# Patient Record
Sex: FEMALE | Race: WHITE | Employment: FULL TIME | URBAN - METROPOLITAN AREA
[De-identification: names, ages, dates, MRNs, and addresses within clinical notes are randomized per-mention and may not be internally consistent; named-entity substitution may affect disease eponyms.]

---

## 2018-12-04 ENCOUNTER — OFFICE VISIT (OUTPATIENT)
Dept: FAMILY MEDICINE CLINIC | Facility: CLINIC | Age: 33
End: 2018-12-04
Payer: COMMERCIAL

## 2018-12-04 VITALS
HEART RATE: 82 BPM | BODY MASS INDEX: 27 KG/M2 | TEMPERATURE: 98 F | DIASTOLIC BLOOD PRESSURE: 64 MMHG | SYSTOLIC BLOOD PRESSURE: 100 MMHG | WEIGHT: 145 LBS

## 2018-12-04 DIAGNOSIS — Z00.00 ROUTINE GENERAL MEDICAL EXAMINATION AT A HEALTH CARE FACILITY: Primary | ICD-10-CM

## 2018-12-04 PROCEDURE — 99395 PREV VISIT EST AGE 18-39: CPT | Performed by: FAMILY MEDICINE

## 2018-12-04 RX ORDER — SWAB
SWAB, NON-MEDICATED MISCELLANEOUS
COMMUNITY
End: 2021-08-25

## 2018-12-04 NOTE — PROGRESS NOTES
HPI:    Patient ID: Jerzy Miller is a 35year old female. HPI  Patient presents with:  Physical: annual physical, PT DECLINED FLU SHOT   Lab: wants lab work due to becoming vegan    Review of Systems   Constitutional: Positive for fatigue.    HENT: ENRIQUE place, and time. She has normal strength and normal reflexes. No sensory deficit. Skin:   No suspicious lesions above the waist exam   Psychiatric: Her mood appears not anxious. She does not exhibit a depressed mood. Vitals reviewed.              ASSESS

## 2019-01-14 ENCOUNTER — LAB ENCOUNTER (OUTPATIENT)
Dept: LAB | Age: 34
End: 2019-01-14
Attending: FAMILY MEDICINE
Payer: COMMERCIAL

## 2019-01-14 DIAGNOSIS — Z00.00 ROUTINE GENERAL MEDICAL EXAMINATION AT A HEALTH CARE FACILITY: ICD-10-CM

## 2019-01-14 LAB
ANION GAP SERPL CALC-SCNC: 10 MMOL/L (ref 0–18)
BASOPHILS # BLD: 0.1 K/UL (ref 0–0.2)
BASOPHILS NFR BLD: 1 %
BUN SERPL-MCNC: 7 MG/DL (ref 8–20)
BUN/CREAT SERPL: 9.2 (ref 10–20)
CALCIUM SERPL-MCNC: 9 MG/DL (ref 8.5–10.5)
CHLORIDE SERPL-SCNC: 107 MMOL/L (ref 95–110)
CHOLEST SERPL-MCNC: 178 MG/DL (ref 110–200)
CO2 SERPL-SCNC: 21 MMOL/L (ref 22–32)
CREAT SERPL-MCNC: 0.76 MG/DL (ref 0.5–1.5)
EOSINOPHIL # BLD: 0.3 K/UL (ref 0–0.7)
EOSINOPHIL NFR BLD: 5 %
ERYTHROCYTE [DISTWIDTH] IN BLOOD BY AUTOMATED COUNT: 14.8 % (ref 11–15)
FERRITIN SERPL IA-MCNC: 23 NG/ML (ref 11–307)
GLUCOSE SERPL-MCNC: 87 MG/DL (ref 70–99)
HCT VFR BLD AUTO: 39.6 % (ref 35–48)
HDLC SERPL-MCNC: 67 MG/DL
HGB BLD-MCNC: 13.1 G/DL (ref 12–16)
LDLC SERPL CALC-MCNC: 100 MG/DL (ref 0–99)
LYMPHOCYTES # BLD: 2.6 K/UL (ref 1–4)
LYMPHOCYTES NFR BLD: 33 %
MCH RBC QN AUTO: 29 PG (ref 27–32)
MCHC RBC AUTO-ENTMCNC: 33.1 G/DL (ref 32–37)
MCV RBC AUTO: 87.7 FL (ref 80–100)
MONOCYTES # BLD: 0.6 K/UL (ref 0–1)
MONOCYTES NFR BLD: 7 %
NEUTROPHILS # BLD AUTO: 4.2 K/UL (ref 1.8–7.7)
NEUTROPHILS NFR BLD: 54 %
NONHDLC SERPL-MCNC: 111 MG/DL
OSMOLALITY UR CALC.SUM OF ELEC: 283 MOSM/KG (ref 275–295)
PLATELET # BLD AUTO: 261 K/UL (ref 140–400)
PMV BLD AUTO: 11.4 FL (ref 7.4–10.3)
POTASSIUM SERPL-SCNC: 4.2 MMOL/L (ref 3.3–5.1)
RBC # BLD AUTO: 4.51 M/UL (ref 3.7–5.4)
SODIUM SERPL-SCNC: 138 MMOL/L (ref 136–144)
TRIGL SERPL-MCNC: 54 MG/DL (ref 1–149)
TSH SERPL-ACNC: 4.44 UIU/ML (ref 0.45–5.33)
WBC # BLD AUTO: 7.7 K/UL (ref 4–11)

## 2019-01-14 PROCEDURE — 82728 ASSAY OF FERRITIN: CPT

## 2019-01-14 PROCEDURE — 85025 COMPLETE CBC W/AUTO DIFF WBC: CPT

## 2019-01-14 PROCEDURE — 80048 BASIC METABOLIC PNL TOTAL CA: CPT

## 2019-01-14 PROCEDURE — 80061 LIPID PANEL: CPT

## 2019-01-14 PROCEDURE — 84443 ASSAY THYROID STIM HORMONE: CPT

## 2019-01-14 PROCEDURE — 82306 VITAMIN D 25 HYDROXY: CPT

## 2019-01-14 PROCEDURE — 36415 COLL VENOUS BLD VENIPUNCTURE: CPT

## 2019-01-16 LAB — 25(OH)D3 SERPL-MCNC: 27.4 NG/ML (ref 30–100)

## 2019-01-28 ENCOUNTER — TELEPHONE (OUTPATIENT)
Dept: OTHER | Age: 34
End: 2019-01-28

## 2019-01-28 DIAGNOSIS — E55.9 VITAMIN D DEFICIENCY: ICD-10-CM

## 2019-01-28 DIAGNOSIS — E03.9 HYPOTHYROIDISM, UNSPECIFIED TYPE: Primary | ICD-10-CM

## 2019-01-28 NOTE — TELEPHONE ENCOUNTER
Patient calling back and states that she spoke with the nurse earlier regarding her test  Results and kind of fast that she needs to clarify it again, advised Dr Val Rosales note and verbalized understanding, patient able to read back the instructions, advis

## 2019-06-20 ENCOUNTER — OFFICE VISIT (OUTPATIENT)
Dept: FAMILY MEDICINE CLINIC | Facility: CLINIC | Age: 34
End: 2019-06-20
Payer: COMMERCIAL

## 2019-06-20 VITALS
BODY MASS INDEX: 26 KG/M2 | HEART RATE: 81 BPM | DIASTOLIC BLOOD PRESSURE: 63 MMHG | SYSTOLIC BLOOD PRESSURE: 99 MMHG | WEIGHT: 137 LBS

## 2019-06-20 DIAGNOSIS — D49.2 SKIN NEOPLASM: ICD-10-CM

## 2019-06-20 DIAGNOSIS — L98.9 SKIN ABNORMALITY: Primary | ICD-10-CM

## 2019-06-20 PROCEDURE — 99212 OFFICE O/P EST SF 10 MIN: CPT | Performed by: FAMILY MEDICINE

## 2019-06-20 PROCEDURE — 99213 OFFICE O/P EST LOW 20 MIN: CPT | Performed by: FAMILY MEDICINE

## 2019-06-20 NOTE — PROGRESS NOTES
HPI:    Patient ID: Anoop Coughlin is a 35year old female. HPI  Patient presents with:  Moles: pt has concern with color of mole on left side of neck      Review of Systems   Skin: Positive for color change.             Current Outpatient Medications:

## 2019-07-06 ENCOUNTER — OFFICE VISIT (OUTPATIENT)
Dept: FAMILY MEDICINE CLINIC | Facility: CLINIC | Age: 34
End: 2019-07-06
Payer: COMMERCIAL

## 2019-07-06 VITALS
SYSTOLIC BLOOD PRESSURE: 118 MMHG | DIASTOLIC BLOOD PRESSURE: 73 MMHG | HEART RATE: 61 BPM | BODY MASS INDEX: 26 KG/M2 | WEIGHT: 137 LBS

## 2019-07-06 DIAGNOSIS — D49.2 SKIN NEOPLASM: Primary | ICD-10-CM

## 2019-07-06 PROCEDURE — 11306 SHAVE SKIN LESION 0.6-1.0 CM: CPT | Performed by: FAMILY MEDICINE

## 2019-07-06 NOTE — PROGRESS NOTES
HPI:    Patient ID: Jeff Magana is a 35year old female. HPI  Patient presents with:  Mole Removal: mole removal on left side of neck    Review of Systems   Skin: Positive for color change.         Left neck growth of mole            Current Outpati

## 2019-07-06 NOTE — PROCEDURES
Area cleaned with alcohol, one cc lidocaine introduced for anesthesia. Shave biopsy performed with #111 blade. Dressing applied. Tolerated well. Cautery used for coag.

## 2019-10-21 ENCOUNTER — TELEPHONE (OUTPATIENT)
Dept: OPHTHALMOLOGY | Facility: CLINIC | Age: 34
End: 2019-10-21

## 2019-10-21 ENCOUNTER — NURSE TRIAGE (OUTPATIENT)
Dept: FAMILY MEDICINE CLINIC | Facility: CLINIC | Age: 34
End: 2019-10-21
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
## 2019-10-22 ENCOUNTER — OFFICE VISIT (OUTPATIENT)
Dept: OPHTHALMOLOGY | Facility: CLINIC | Age: 34
End: 2019-10-22
Payer: COMMERCIAL

## 2019-10-22 DIAGNOSIS — S05.01XA CORNEAL ABRASION, RIGHT, INITIAL ENCOUNTER: Primary | ICD-10-CM

## 2019-10-22 PROCEDURE — 99242 OFF/OP CONSLTJ NEW/EST SF 20: CPT | Performed by: OPHTHALMOLOGY

## 2019-10-22 RX ORDER — SODIUM CHLORIDE 5 %
OINTMENT (GRAM) OPHTHALMIC (EYE)
Qty: 1 TUBE | Refills: 0 | Status: SHIPPED | OUTPATIENT
Start: 2019-10-22 | End: 2020-01-22

## 2019-10-22 NOTE — PATIENT INSTRUCTIONS
Corneal abrasion, right, initial encounter  Discussed diagnosis and treatment in detail with patient. Information given. Start Adi 128 ointment (over the counter)  at bedtime in the right eye for 90 days.    Patient should call office and make retu motor vehicle or operate machinery until all symptoms are gone. You may have trouble judging distances using only one eye. · If your eyes are sensitive to light, try wearing sunglasses, or stay indoors until symptoms go away.     Follow-up care  Follow up serious. These are medical emergencies. Something in your eye  If you think you have something small in your eye, flush it with water right away. Pull your upper lid out and over your bottom lid. This will help increase the flow of tears across your eye.

## 2019-10-22 NOTE — ASSESSMENT & PLAN NOTE
Discussed diagnosis and treatment in detail with patient. Information given. Start Adi 128 ointment (over the counter)  at bedtime in the right eye for 90 days.    Patient should call office and make return appointment if symptoms worsen or do not
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
## 2020-02-26 ENCOUNTER — TELEPHONE (OUTPATIENT)
Dept: OPHTHALMOLOGY | Facility: CLINIC | Age: 35
End: 2020-02-26

## 2020-02-26 ENCOUNTER — OFFICE VISIT (OUTPATIENT)
Dept: OPHTHALMOLOGY | Facility: CLINIC | Age: 35
End: 2020-02-26
Payer: COMMERCIAL

## 2020-02-26 DIAGNOSIS — H18.831 RECURRENT CORNEAL EROSION, RIGHT: Primary | ICD-10-CM

## 2020-02-26 PROCEDURE — 99213 OFFICE O/P EST LOW 20 MIN: CPT | Performed by: OPHTHALMOLOGY

## 2020-02-26 PROCEDURE — 99070 SPECIAL SUPPLIES PHYS/QHP: CPT | Performed by: OPHTHALMOLOGY

## 2020-02-26 PROCEDURE — 92071 CONTACT LENS FITTING FOR TX: CPT | Performed by: OPHTHALMOLOGY

## 2020-02-26 RX ORDER — KETOROLAC TROMETHAMINE 5 MG/ML
1 SOLUTION OPHTHALMIC 4 TIMES DAILY
Qty: 1 BOTTLE | Refills: 0 | Status: SHIPPED | OUTPATIENT
Start: 2020-02-26 | End: 2021-08-25

## 2020-02-26 NOTE — ASSESSMENT & PLAN NOTE
Discussed diagnosis and treatment in detail with patient. Bandage contact lens applied to right eye. AV2 +0.75/8.7/14.0  Exp 1/3/2023  Start Acular drops 4 times a day in the right eye. Leave contact in until next visit next week.

## 2020-02-26 NOTE — TELEPHONE ENCOUNTER
Per pt she has been having R eye pain since yesterday, states she feels like it is getting worse, asking for appointment soon. Please call thank you.

## 2020-02-26 NOTE — TELEPHONE ENCOUNTER
Spoke with patient, complains of pain and light sensitivity OD since yesterday. Pt was seen on 10/22/19 for a corneal abrasion OD and feels that it has never fully healed, feels like it is the same problem as last time.  Pt coming @ 1:00pm today

## 2020-02-26 NOTE — PROGRESS NOTES
Lexie Chandra is a 29year old female. HPI:     HPI     Patient is here for an evaluation of redness, pain (10/10) and photophobia in the right eye  since this morning when she opened her eye.    She feels \"like a wound reopened\" and she is concerne • PRENATAL 28-0.8 MG Oral Tab Take by mouth.          Allergies:  No Known Allergies    ROS:       PHYSICAL EXAM:     Base Eye Exam     Visual Acuity (Snellen - Linear)       Right Left    Dist sc 20/30- 20/20            Slit Lamp and Fundus Exam     Slit

## 2020-03-03 ENCOUNTER — TELEPHONE (OUTPATIENT)
Dept: OPHTHALMOLOGY | Facility: CLINIC | Age: 35
End: 2020-03-03

## 2020-03-04 ENCOUNTER — OFFICE VISIT (OUTPATIENT)
Dept: OPHTHALMOLOGY | Facility: CLINIC | Age: 35
End: 2020-03-04
Payer: COMMERCIAL

## 2020-03-04 DIAGNOSIS — S05.01XD CORNEAL ABRASION, RIGHT, SUBSEQUENT ENCOUNTER: Primary | ICD-10-CM

## 2020-03-04 PROCEDURE — 99213 OFFICE O/P EST LOW 20 MIN: CPT | Performed by: OPHTHALMOLOGY

## 2020-03-04 NOTE — PATIENT INSTRUCTIONS
Corneal abrasion, right, subsequent encounter  Bandage contact lens removed. Discussed that this may cause corneal symptoms to reoccur in the right eye.   Continue Ketorolac 4 times a day for 1 week in the right eye and then use as needed in the right eye

## 2020-03-04 NOTE — PROGRESS NOTES
Lexie Chandra is a 29year old female. HPI:     HPI      Patient is here for a recheck. She complains of dryness in her right eye due to the bandage CL. She feels like there is a piece of glass in her right eye for several days.   She is taking Keto Right Left    Lids/Lashes Meibomian gland dysfunction Meibomian gland dysfunction    Conjunctiva/Sclera 1+ Injection Non-injected    Cornea CL in place (removed in office) , irregular epithelium at 7 o'clock- improved, no staining Clear    Anterior Chamber

## 2020-03-04 NOTE — ASSESSMENT & PLAN NOTE
Bandage contact lens removed. Discussed that this may cause corneal symptoms to reoccur in the right eye. Continue Ketorolac 4 times a day for 1 week in the right eye and then use as needed in the right eye for discomfort.     Use any brand of artificial

## 2020-08-28 ENCOUNTER — PATIENT MESSAGE (OUTPATIENT)
Dept: FAMILY MEDICINE CLINIC | Facility: CLINIC | Age: 35
End: 2020-08-28

## 2020-08-28 NOTE — TELEPHONE ENCOUNTER
Dr. Jarrett Walker,     Please see [facility] message below, thank you         From: Lexie Chandra  To: Demetrius Mccall DO  Sent: 8/28/2020  4:45 PM CDT  Subject: Other    Dr. Jarrett Walker,    I have not heard from my  yet.  I'm sure she will have no problem wri

## 2020-08-29 NOTE — TELEPHONE ENCOUNTER
Noted and discussed with her on phone. Incidentally father was calling and I informed him that I need a letter from . Either way, there is no medical issue with the child at this time anyway.

## 2020-09-01 ENCOUNTER — PATIENT MESSAGE (OUTPATIENT)
Dept: FAMILY MEDICINE CLINIC | Facility: CLINIC | Age: 35
End: 2020-09-01

## 2020-09-01 NOTE — TELEPHONE ENCOUNTER
Patient calling to confirm attachment, advised no attachment received, she's having difficulty with sending and said she will fax over, number provided, form is regarding care for her daughter.

## 2020-11-27 ENCOUNTER — TELEPHONE (OUTPATIENT)
Dept: FAMILY MEDICINE CLINIC | Facility: CLINIC | Age: 35
End: 2020-11-27

## 2020-11-27 NOTE — TELEPHONE ENCOUNTER
Patient requesting to be tested for COVID, patient was exposed due to grandparent testing positive for COVID. States she was wearing a when she was around grandparent, and she is not having symptoms. Please advise. Patient requesting a call back.

## 2020-11-27 NOTE — TELEPHONE ENCOUNTER
Spoke to patient. She and daughter were exposed to grandfather 5-6 days ago. They found out today that grandfather tested positive for COVID. She stated they were within 6 feet of one another and spent more than 15 min together. Masks were worn.      Frankie Mensah

## 2021-08-25 ENCOUNTER — OFFICE VISIT (OUTPATIENT)
Dept: OBGYN CLINIC | Facility: CLINIC | Age: 36
End: 2021-08-25
Payer: COMMERCIAL

## 2021-08-25 VITALS
HEIGHT: 61.5 IN | BODY MASS INDEX: 28.94 KG/M2 | DIASTOLIC BLOOD PRESSURE: 68 MMHG | HEART RATE: 90 BPM | SYSTOLIC BLOOD PRESSURE: 106 MMHG | WEIGHT: 155.25 LBS

## 2021-08-25 DIAGNOSIS — Z01.419 ENCOUNTER FOR WELL WOMAN EXAM WITH ROUTINE GYNECOLOGICAL EXAM: Primary | ICD-10-CM

## 2021-08-25 DIAGNOSIS — R10.2 PELVIC PAIN: ICD-10-CM

## 2021-08-25 DIAGNOSIS — Z12.4 SCREENING FOR CERVICAL CANCER: ICD-10-CM

## 2021-08-25 PROCEDURE — 88175 CYTOPATH C/V AUTO FLUID REDO: CPT | Performed by: OBSTETRICS & GYNECOLOGY

## 2021-08-25 PROCEDURE — 99395 PREV VISIT EST AGE 18-39: CPT | Performed by: OBSTETRICS & GYNECOLOGY

## 2021-08-25 PROCEDURE — 3078F DIAST BP <80 MM HG: CPT | Performed by: OBSTETRICS & GYNECOLOGY

## 2021-08-25 PROCEDURE — 87624 HPV HI-RISK TYP POOLED RSLT: CPT | Performed by: OBSTETRICS & GYNECOLOGY

## 2021-08-25 PROCEDURE — 3008F BODY MASS INDEX DOCD: CPT | Performed by: OBSTETRICS & GYNECOLOGY

## 2021-08-25 PROCEDURE — 3074F SYST BP LT 130 MM HG: CPT | Performed by: OBSTETRICS & GYNECOLOGY

## 2021-08-25 NOTE — PROGRESS NOTES
[facility]  Obstetrics and Gynecology    Subjective:     Rodrigo Johnson is a 28year old [address] [place] [address] who presents for an annual gyn exam. Patient complaints include intermittent pelvic pain, not able to correlate with her menstrual cycle, no allevi depression, anxiety, suicidal ideations, hallucinations, insomnia     Past Medical History   Past Medical History:   Diagnosis Date   • ANXIETY    • Asthma     at age of 1    • Asthma, mild intermittent 1/29/2010   • Interstitial cystitis 10/19/2009   • Se without enlargement or nodularity  BREAST: bilaterally symmetric with no palpable masses, no nipple discharge, no skin changes  CV: RRR  PULM: CTAB  ABD: soft, NT, ND, no rebound or guarding  EXT: no c/c/e or tenderness  NEURO: CN 2-12 grossly intact  PELV

## 2021-08-26 LAB — HPV I/H RISK 1 DNA SPEC QL NAA+PROBE: NEGATIVE

## 2021-09-17 ENCOUNTER — ULTRASOUND ENCOUNTER (OUTPATIENT)
Dept: OBGYN CLINIC | Facility: CLINIC | Age: 36
End: 2021-09-17
Payer: COMMERCIAL

## 2021-09-17 DIAGNOSIS — R10.2 PELVIC PAIN: Primary | ICD-10-CM

## 2021-09-17 PROCEDURE — 76856 US EXAM PELVIC COMPLETE: CPT | Performed by: OBSTETRICS & GYNECOLOGY

## 2021-09-17 PROCEDURE — 76830 TRANSVAGINAL US NON-OB: CPT | Performed by: OBSTETRICS & GYNECOLOGY

## 2025-03-14 NOTE — TELEPHONE ENCOUNTER
Action Requested: Summary for Provider     []  Critical Lab, Recommendations Needed  [] Need Additional Advice  []   FYI    []   Need Orders  [] Need Medications Sent to Pharmacy  []  Other     SUMMARY: pt states she has blurred vision in her right eye, as Continue Regimen: Ketoconazole 2% cream BID as needed, Hydrocortisone 2.5% cream BID as needed for flares Initiate Treatment: Pimecrolimus 1% cream QD as needed Detail Level: Zone Render In Strict Bullet Format?: No

## 2025-04-15 NOTE — PATIENT INSTRUCTIONS
Corneal abrasion, right, initial encounter  Discussed diagnosis and treatment in detail with patient. Bandage contact lens applied to right eye. AV2 +0.75/8.7/14.0  Exp 1/3/2023  Start Acular drops 4 times a day in the right eye.   Leave contact in unti
Is This A New Presentation Or A Follow-Up?: Dry Skin
How Severe Is Your Dry Skin?: mild

## (undated) NOTE — LETTER
October 22, 2019    Elodia Lane DO  [address] [facility]     Patient: Chan Pinto   YOB: 1985   Date of Visit: 10/22/2019       Dear Dr. Pam Gates, DO:    Thank you for referring Rochelle Haque to me for evaluation.  H Medications:  Sodium Chloride, Hypertonic, (HERNANDO 128) 5 % Ophthalmic Ointment, Apply to right eye at bedtime for 90 days, Disp: 1 Tube, Rfl: 0  PRENATAL 28-0.8 MG Oral Tab, Take by mouth., Disp: , Rfl:         Allergies:  No Known Allergies    ROS:     ROS • Sodium Chloride, Hypertonic, (HERNANDO 128) 5 % Ophthalmic Ointment 1 Tube 0     Sig: Apply to right eye at bedtime for 90 days        Follow up instructions:  Return if symptoms worsen or fail to improve.     10/22/2019  Scribed by: Garry Andres MD

## (undated) NOTE — MR AVS SNAPSHOT
After Visit Summary   8/25/2021    Raissa Ya    MRN: [id number]           Visit Information     Date & Time  8/25/2021 11:00 AM Provider  Suzy Irizarry MD Department  [facility], [address]  Dept.  Phone  63 causes of pelvic pain. The pain may be due to a problem in the female reproductive system. Or it may be due to a problem in the digestive, urinary, or musculoskeletal systems. Based on your visit today, the exact cause of your pelvic pain is not certain. Video Visits are available Monday - Friday for many common conditions such as allergies, colds, cough, fever, rash, sore throat, headache and pink eye.   The cost for a Video Visit is currently $35.         If you receive a survey from [employer], please t [facility]  Emergency Medicine Providers  Conditions needing urgent attention, but are   non-life-threatening.     Also available by appointment Average cost  $120*     EMERGENCY ROOM Life-threatening emergencies needing immediate intervention at a hospit

## (undated) NOTE — LETTER
01/03/19        [address] [address]      Dear Jamal,    [address] records indicate that you have outstanding lab work and or testing that was ordered for you and has not yet been completed:  Orders Placed This Encounter

## (undated) NOTE — LETTER
AUTHORIZATION FOR SURGICAL OPERATION OR OTHER PROCEDURE    1.  I hereby authorize Dr. Jennifer Gonzalez, and [facility] staff assigned to my case to perform the following operation and/or procedure at the [facility]: Witness signature: ___________________________________________________ Date:  ______/______/_____                    Time:  ________ A. M.  P.M.        Patient Name:  ______________________________________________________  (please print)      Patient signatu